# Patient Record
Sex: MALE | Race: WHITE | NOT HISPANIC OR LATINO
[De-identification: names, ages, dates, MRNs, and addresses within clinical notes are randomized per-mention and may not be internally consistent; named-entity substitution may affect disease eponyms.]

---
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
## 2024-02-28 NOTE — ED PEDIATRIC NURSE NOTE - SUICIDE SCREENING QUESTION 5
----- Message from Yahaira Fortune sent at 2/27/2024  4:52 PM CST -----  Type:  Patient Returning Call    Who Called: Pt   Who Left Message for Patient: office   Does the patient know what this is regarding?: n/a   Would the patient rather a call back or a response via [facility]? Callback   Best Call Back Number: [phone]  Additional Information:         
Spoke with patient, per Mr. Pretty- he is scheduled with his cardiologist this afternoon (02/28/)  
No

## 2024-03-25 NOTE — H&P PST ADULT - NSCAFFEAMTFREQ_GEN_ALL_CORE_SD
no lesions, no deformities, no traumatic injuries, no significant scars are present, chest wall non-tender, no masses present, breathing is unlabored without accessory muscle use,normal breath sounds occasional use

## 2024-08-23 NOTE — PROCEDURE
[FreeTextEntry1] :  EXAMINATION:  US SCROTUM 08/27/2024  In Office  FINDINGS: UNREMARKABLE SCROTAL CONTENTS; NORMAL TESTICULAR ECHOGENICITY AND ECHOTEXTURE. NORMAL ARTERIAL AND VENOUS BLOOD FLOW

## 2024-08-27 ENCOUNTER — APPOINTMENT (OUTPATIENT)
Dept: PEDIATRIC UROLOGY | Facility: CLINIC | Age: 19
End: 2024-08-27

## 2024-08-27 VITALS — BODY MASS INDEX: 21 KG/M2 | HEIGHT: 71 IN | WEIGHT: 150 LBS

## 2024-08-27 DIAGNOSIS — N43.3 HYDROCELE, UNSPECIFIED: ICD-10-CM

## 2024-08-27 DIAGNOSIS — I86.1 SCROTAL VARICES: ICD-10-CM

## 2024-08-27 PROCEDURE — 93976 VASCULAR STUDY: CPT

## 2024-08-27 PROCEDURE — 76870 US EXAM SCROTUM: CPT

## 2024-08-27 PROCEDURE — 99214 OFFICE O/P EST MOD 30 MIN: CPT

## 2024-08-28 ENCOUNTER — APPOINTMENT (OUTPATIENT)
Dept: HUMAN REPRODUCTION | Facility: CLINIC | Age: 19
End: 2024-08-28
Payer: COMMERCIAL

## 2024-08-28 PROCEDURE — 89322 SEMEN ANAL STRICT CRITERIA: CPT

## 2024-09-01 PROBLEM — N43.3 LEFT HYDROCELE: Status: ACTIVE | Noted: 2024-09-01

## 2024-09-01 NOTE — REASON FOR VISIT
[Follow-Up Visit] : a follow-up visit [Parents] : parents [TextBox_50] : laparoscopic left varicocelectomy, right scrotal hydrocelectomy 6/20/23

## 2024-09-01 NOTE — PHYSICAL EXAM
[TextBox_92] : Large left  scrotal  hydrocele without varicocele.  Right testis in scrotum without hydrocele

## 2024-09-01 NOTE — DATA REVIEWED
[FreeTextEntry1] : EXAMINATION:  US SCROTUM 08/27/2024  In Office  FINDINGS: LEFT HYDROCELE OTHEREWSIE UNREMARKABLE SCROTAL CONTENTS; NORMAL TESTICULAR ECHOGENICITY AND ECHOTEXTURE. NORMAL ARTERIAL AND VENOUS BLOOD FLOW

## 2024-09-01 NOTE — CONSULT LETTER
[FreeTextEntry1] : Dear Dr. MARIJA WOODWARD ,\par  \par  I had the pleasure of seeing  MARJAN BOJORQUEZ for follow up today.  Below is my note regarding the office visit today.\par  \par  Thank you so very much for allowing me to participate in MARJAN's  care.  Please don't hesitate to call me should any questions or issues arise .\par  \par  Sincerely, \par  \par  Erwin\par  \par  Erwin Narayan MD, FACS, FSPU\par  Chief, Pediatric Urology\par  Professor of Urology and Pediatrics\par  [facility] [employer] [employer]\par  \par  President, [employer] Association - [place] Section\par  Past-President, Societies [employer] Pediatric Urology\par

## 2024-09-01 NOTE — CONSULT LETTER
[FreeTextEntry1] : Dear Dr. MARIJA WOODWARD ,\par  \par  I had the pleasure of seeing  MARJAN BOJORQUEZ for follow up today.  Below is my note regarding the office visit today.\par  \par  Thank you so very much for allowing me to participate in MARJAN's  care.  Please don't hesitate to call me should any questions or issues arise .\par  \par  Sincerely, \par  \par  Erwin\par  \par  Ewrin Narayan MD, FACS, FSPU\par  Chief, Pediatric Urology\par  Professor of Urology and Pediatrics\par  [employer]\par  \par  President, [employer] Association - [place] Section\par  Past-President, Societies for Pediatric Urology\par

## 2024-09-01 NOTE — HISTORY OF PRESENT ILLNESS
[TextBox_4] : Status post varicocelectomy 6/2023.  Jerome continues to be doing well post operatively.  No reported pain.  Denies any urologic issues.  No reported fevers.  Returns today for reexamination.  CSA not obtained prior to today's visit.

## 2024-09-01 NOTE — ASSESSMENT
[FreeTextEntry1] : Jerome had a successful left varicocele repair but developed a large hydrocele.  This occurs in about 8% of cases for unclear reason.  Approximately 1/3 require intervention and I believe this is one of those cases given the large size.  However, he is off to [place] for another year and so the repair will be deferred for now.  I explained to him and Mom (by phone) that the procedure would be as we did on the right side via scrotal incision.  He would also benefit from a semen analysis to see the potential improvement following the varicocelectomy.  All questions were answered to their satisfaction.

## 2024-09-01 NOTE — CONSULT LETTER
[FreeTextEntry1] : Dear Dr. MARIJA WOODWARD ,\par  \par  I had the pleasure of seeing  MARJAN BOJORQUEZ for follow up today.  Below is my note regarding the office visit today.\par  \par  Thank you so very much for allowing me to participate in MARJAN's  care.  Please don't hesitate to call me should any questions or issues arise .\par  \par  Sincerely, \par  \par  Erwin\par  \par  Erwin Narayan MD, FACS, FSPU\par  Chief, Pediatric Urology\par  Professor of Urology and Pediatrics\par  [employer] [facility] [employer]\par  \par  President, [employer] - [place] Section\par  Past-President, Societies for Pediatric Urology\par

## 2024-09-04 ENCOUNTER — NON-APPOINTMENT (OUTPATIENT)
Age: 19
End: 2024-09-04

## 2024-10-14 NOTE — ED PROVIDER NOTE - CROS ED GI ALL NEG
Date: 10/14/24     Dietitian Name: Rachel Villaseñor RD, CD  Phone #: [phone]     Estimated body mass index is 51.89 kg/m² as calculated from the following:    Height as of this encounter: 5' 6\" (1.676 m).    Weight as of this encounter: 145.8 kg (321 lb 8 oz).       Bariatric Nutrition and Activity Plan: (Getting Ready for Surgery)    1. Eat 3 small meals per day  2. Choose foods low in sugar  3. Choose foods low in fat  4. Eat meals slowly  5. Choose low calorie non-carbonated beverages   6. Be physically active  7. Attendance at a bariatric support group  8. 5% weight loss (309 pounds)    Personal Goals:  Choose foods with 10 grams or less of sugar per serving.  Purchase measuring cups  Log food intake daily using [employer] violeta   
negative - no vomiting, no diarrhea

## 2025-04-10 NOTE — ED PROVIDER NOTE - CLINICAL SUMMARY MEDICAL DECISION MAKING FREE TEXT BOX
If you are a smoker, it is important for your health to stop smoking. Please be aware that second hand smoke is also harmful. 14 yoM with no PMHx here for rabies vaccination. Pt referred by pediatrician this morning. Pt woke up with a bat above bed this morning. Pt does not appreciate bite/being bitten. No fever, decreased appetite, lymphadenopathy, malaise, myalgia, weakness, fatigue, anorexia, sore throat, n/v, HA. IUTD, no known sick contacts. PE unremarkable. Will consult ID due to lack of lesion, anticipate vaccination only, will omit immunoglobulin. Dc home with s/sx rabies and when to return for subsequent injections. 14 yoM with no PMHx here for rabies vaccination. Pt referred by pediatrician this morning. Pt woke up with a bat above bed this morning. Pt does not appreciate bite/being bitten. No fever, decreased appetite, lymphadenopathy, malaise, myalgia, weakness, fatigue, anorexia, sore throat, n/v, HA. IUTD, no known sick contacts. PE unremarkable. Will consult ID due to lack of lesion, both vaccine and immunoglobulin may be required. Dc home with s/sx rabies and when to return for subsequent injections.
